# Patient Record
Sex: FEMALE | Race: WHITE | NOT HISPANIC OR LATINO | URBAN - METROPOLITAN AREA
[De-identification: names, ages, dates, MRNs, and addresses within clinical notes are randomized per-mention and may not be internally consistent; named-entity substitution may affect disease eponyms.]

---

## 2023-11-22 ENCOUNTER — APPOINTMENT (OUTPATIENT)
Dept: OBSTETRICS AND GYNECOLOGY | Facility: CLINIC | Age: 33
End: 2023-11-22
Payer: COMMERCIAL

## 2023-11-30 ENCOUNTER — OFFICE VISIT (OUTPATIENT)
Dept: OBSTETRICS AND GYNECOLOGY | Facility: CLINIC | Age: 33
End: 2023-11-30
Payer: COMMERCIAL

## 2023-11-30 VITALS
BODY MASS INDEX: 20.43 KG/M2 | HEIGHT: 62 IN | DIASTOLIC BLOOD PRESSURE: 62 MMHG | SYSTOLIC BLOOD PRESSURE: 104 MMHG | WEIGHT: 111 LBS

## 2023-11-30 DIAGNOSIS — R87.618 PAP SMEAR ABNORMALITY OF CERVIX/HUMAN PAPILLOMAVIRUS (HPV) POSITIVE: ICD-10-CM

## 2023-11-30 DIAGNOSIS — Z01.419 ENCOUNTER FOR ANNUAL ROUTINE GYNECOLOGICAL EXAMINATION: Primary | ICD-10-CM

## 2023-11-30 DIAGNOSIS — R87.612 LOW GRADE SQUAMOUS INTRAEPITH LESION ON CYTOLOGIC SMEAR CERVIX (LGSIL): ICD-10-CM

## 2023-11-30 PROCEDURE — 87624 HPV HI-RISK TYP POOLED RSLT: CPT

## 2023-11-30 PROCEDURE — 99395 PREV VISIT EST AGE 18-39: CPT | Mod: MUE

## 2023-11-30 PROCEDURE — 88175 CYTOPATH C/V AUTO FLUID REDO: CPT | Mod: TC

## 2023-11-30 PROCEDURE — 99395 PREV VISIT EST AGE 18-39: CPT

## 2023-11-30 RX ORDER — SUMATRIPTAN 50 MG/1
50 TABLET, FILM COATED ORAL ONCE AS NEEDED
COMMUNITY

## 2023-11-30 RX ORDER — ACETAMINOPHEN 325 MG/1
TABLET ORAL
COMMUNITY

## 2023-11-30 ASSESSMENT — ENCOUNTER SYMPTOMS
NEUROLOGICAL NEGATIVE: 0
CHILLS: 0
UNEXPECTED WEIGHT CHANGE: 0
DYSURIA: 0
RESPIRATORY NEGATIVE: 0
FEVER: 0
PSYCHIATRIC NEGATIVE: 0
ENDOCRINE NEGATIVE: 0
DIZZINESS: 0
COLOR CHANGE: 0
CARDIOVASCULAR NEGATIVE: 0
COUGH: 0
CONSTITUTIONAL NEGATIVE: 0
OCCASIONAL FEELINGS OF UNSTEADINESS: 0
HEADACHES: 0
EYES NEGATIVE: 0
ABDOMINAL PAIN: 0
MUSCULOSKELETAL NEGATIVE: 0
SHORTNESS OF BREATH: 0
VOMITING: 0
HEMATOLOGIC/LYMPHATIC NEGATIVE: 0
FATIGUE: 0
DEPRESSION: 0
GASTROINTESTINAL NEGATIVE: 0
ALLERGIC/IMMUNOLOGIC NEGATIVE: 0
NAUSEA: 0
LOSS OF SENSATION IN FEET: 0

## 2023-11-30 ASSESSMENT — LIFESTYLE VARIABLES
HOW OFTEN DO YOU HAVE SIX OR MORE DRINKS ON ONE OCCASION: NEVER
HOW OFTEN DO YOU HAVE A DRINK CONTAINING ALCOHOL: NEVER
AUDIT-C TOTAL SCORE: 0
HOW MANY STANDARD DRINKS CONTAINING ALCOHOL DO YOU HAVE ON A TYPICAL DAY: PATIENT DOES NOT DRINK
SKIP TO QUESTIONS 9-10: 1

## 2023-11-30 ASSESSMENT — PATIENT HEALTH QUESTIONNAIRE - PHQ9
SUM OF ALL RESPONSES TO PHQ9 QUESTIONS 1 & 2: 0
1. LITTLE INTEREST OR PLEASURE IN DOING THINGS: NOT AT ALL
2. FEELING DOWN, DEPRESSED OR HOPELESS: NOT AT ALL

## 2023-11-30 ASSESSMENT — PAIN SCALES - GENERAL: PAINLEVEL: 0-NO PAIN

## 2023-11-30 NOTE — PROGRESS NOTES
"Markie 2022  Subjective   Bob Wheeler is a 32 y.o. female who is here for a routine GYN exam. Last saw Dr. Mensah for colposcopy 2022, pathology c/w HPV changes. Pap prior to colpo 2022 with pathology ASCUS/LGSIL/pos HPV. Stopped OCP , has been actively trying to conceive without success. Menses are overall regular, tracking with [employer]ary, active during predicted ovulatory window. Has not used ovulation strips. Has stopped smoking cigarettes, has now been trialing vape to help stop completely and has already noticed improvement and lack of desire to want to smoke. Partner does smoke regularly. Believes partner had semen analysis with his provider that came back normal. Denies known STI history. Taking OTC prenatal vitamin gummy regularly. Denies vaginal irritation symptoms. Denies breast changes or concerns.    Complaints: see HPI  Periods: regular  Dysmenorrhea: none    Current contraception: none  History of abnormal Pap smear: yes  History of abnormal mammogram: no      OB History          1    Para        Term                AB   1    Living             SAB        IAB        Ectopic        Multiple        Live Births                      Review of Systems   Constitutional:  Negative for chills, fatigue, fever and unexpected weight change.   Respiratory:  Negative for cough and shortness of breath.    Gastrointestinal:  Negative for abdominal pain, nausea and vomiting.   Genitourinary:  Negative for dyspareunia, dysuria, pelvic pain and vaginal discharge.   Skin:  Negative for color change and rash.   Neurological:  Negative for dizziness and headaches.       Objective   /62   Ht 1.581 m (5' 2.25\")   Wt 50.3 kg (111 lb)   LMP 2023   BMI 20.14 kg/m²        General:   Alert and oriented, in no acute distress   Neck: Supple. No visible thyromegaly.    Breast/Axilla: Normal to palpation bilaterally without masses, skin changes, or nipple discharge.    Abdomen: Soft, non-tender, " without masses or organomegaly   Vulva: Normal architecture without erythema, masses, or lesions.    Vagina: Normal mucosa without lesions, masses, or atrophy. No abnormal vaginal discharge.    Cervix: Normal without masses, lesions, or signs of cervicitis; pap smear performed   Uterus: Normal, mobile, non-enlarged uterus   Adnexa: Normal without masses or lesions   Pelvic Floor Normal    Psych Normal affect. Normal mood.      Assessment/Plan   -Pap 5/2022 ASCUS/LGSIL/pos HPV, colpo 6/2022 bx c/w HPV changes; due for repeat pap smear today, obtained.  -Discussed family planning; encouraged continued [employer] zachary use to track menses but encouraged trialing ovulation kit use to better analyze fertile window in combination with timed intercourse, continue prenatal vitamin, importance of smoking cessation in regards to egg/sperm quality, and Importance of healthy diet, routine exercise, and sleep hygiene. Patient will trial these recommendations and call if no success, will plan to have her see OB docs for further evaluation and recommendations at that time.    Tabitha Carmona PA-C    82

## 2023-12-19 LAB
CYTOLOGY CMNT CVX/VAG CYTO-IMP: NORMAL
HPV HR 12 DNA GENITAL QL NAA+PROBE: NEGATIVE
HPV HR GENOTYPES PNL CVX NAA+PROBE: NEGATIVE
HPV16 DNA SPEC QL NAA+PROBE: NEGATIVE
HPV18 DNA SPEC QL NAA+PROBE: NEGATIVE
LAB AP HPV GENOTYPE QUESTION: YES
LAB AP HPV HR: NORMAL
LAB AP PREVIOUS ABNORMAL HISTORY: NORMAL
LABORATORY COMMENT REPORT: NORMAL
LMP START DATE: NORMAL
PATH REPORT.TOTAL CANCER: NORMAL

## 2024-01-24 ENCOUNTER — APPOINTMENT (OUTPATIENT)
Dept: UROLOGY | Facility: CLINIC | Age: 34
End: 2024-01-24
Payer: COMMERCIAL

## 2024-02-06 ENCOUNTER — OFFICE VISIT (OUTPATIENT)
Dept: UROLOGY | Facility: CLINIC | Age: 34
End: 2024-02-06
Payer: COMMERCIAL

## 2024-02-06 VITALS
BODY MASS INDEX: 20.54 KG/M2 | DIASTOLIC BLOOD PRESSURE: 104 MMHG | HEART RATE: 84 BPM | SYSTOLIC BLOOD PRESSURE: 128 MMHG | HEIGHT: 61 IN | WEIGHT: 108.8 LBS

## 2024-02-06 DIAGNOSIS — F52.9 SEXUAL DYSFUNCTION IN FEMALE: Primary | ICD-10-CM

## 2024-02-06 PROCEDURE — 99204 OFFICE O/P NEW MOD 45 MIN: CPT | Performed by: NURSE PRACTITIONER

## 2024-02-06 PROCEDURE — 1036F TOBACCO NON-USER: CPT | Performed by: NURSE PRACTITIONER

## 2024-02-06 RX ORDER — BUPROPION HYDROCHLORIDE 75 MG/1
TABLET ORAL
Qty: 120 TABLET | Refills: 11 | Status: SHIPPED | OUTPATIENT
Start: 2024-02-06

## 2024-02-06 NOTE — PROGRESS NOTES
UROLOGIC INITIAL EVALUATION     PROBLEM LIST:  1. Sexual dysfunction in female  Follicle stimulating hormone    Testosterone    Estrogens, Total    Progesterone    buPROPion (Wellbutrin) 75 mg tablet    Referral to Physical Therapy           HISTORY OF PRESENT ILLNESS:   Bob Wheeler is a 33 y.o. with hx depression  Presents today for evaluation and management of sexual dysfunction  Reports discomfort with sex, lack of lubrication, and low desire  , they are currently living with his parents    PAST MEDICAL HISTORY:  Past Medical History:   Diagnosis Date    Anxiety     Depression     History of abnormal cervical Pap smear     History of dysmenorrhea     Migraines        PAST SURGICAL HISTORY:  Past Surgical History:   Procedure Laterality Date    EYE SURGERY          ALLERGIES:   No Known Allergies     MEDICATIONS:   Current Outpatient Medications on File Prior to Visit   Medication Sig Dispense Refill    SUMAtriptan (Imitrex) 50 mg tablet Take 1 tablet (50 mg) by mouth 1 time if needed.      acetaminophen (Tylenol) 325 mg tablet TAKE 1 TABLET EVERY SIX HOURS AS NEEDED FOR PAIN OR FEVER FOR UP TO 7 DAYS       No current facility-administered medications on file prior to visit.        SOCIAL HISTORY:  Patient  reports that she quit smoking about 6 months ago. Her smoking use included cigarettes. She has never used smokeless tobacco. She reports that she does not currently use alcohol. She reports current drug use. Drug: Marijuana.   Social History     Socioeconomic History    Marital status: Significant Other     Spouse name: Not on file    Number of children: Not on file    Years of education: Not on file    Highest education level: Not on file   Occupational History    Not on file   Tobacco Use    Smoking status: Former     Types: Cigarettes     Quit date: 2023     Years since quittin.5    Smokeless tobacco: Never   Vaping Use    Vaping Use: Every day    Substances: Nicotine, Flavoring     "Devices: Disposable   Substance and Sexual Activity    Alcohol use: Not Currently    Drug use: Yes     Types: Marijuana    Sexual activity: Yes     Partners: Male     Birth control/protection: None   Other Topics Concern    Not on file   Social History Narrative    Not on file     Social Determinants of Health     Financial Resource Strain: Not on file   Food Insecurity: Not on file   Transportation Needs: Not on file   Physical Activity: Not on file   Stress: Not on file   Social Connections: Not on file   Intimate Partner Violence: Not on file   Housing Stability: Not on file       FAMILY HISTORY:  Family History   Problem Relation Name Age of Onset    Asthma Mother      No Known Problems Father         REVIEW OF SYSTEMS:  All systems reviewed, pertinent negatives as noted in HPI.     PHYSICAL EXAM:  Visit Vitals  BP (!) 128/104   Pulse 84     Constitutional: Well-developed and well-nourished. No distress.    Head: Normocephalic and atraumatic.    Neck: Normal range of motion.     Pulmonary/Chest: Effort normal. No respiratory distress.   Abdominal: Non-distended.  : Deferred.  Integumentary: No rash or lesions visualized.  Musculoskeletal: Normal range of motion.    Neurological: Alert and oriented.  Psychiatric: Normal mood and affect. Thought content normal.      LABORATORY REVIEW:   No results found for: \"GLU\", \"BUN\", \"CREATININE\", \"EGFR\", \"NA\", \"K\", \"CL\", \"CO2\", \"OSMOLALITY\", \"CALCIUM\"   No results found for: \"WBC\", \"RBC\", \"HGB\", \"HCT\", \"MCV\", \"MCH\", \"MCHC\", \"RDW\", \"PLT\", \"MPV\"          Assessment:      1. Sexual dysfunction in female  Follicle stimulating hormone    Testosterone    Estrogens, Total    Progesterone    buPROPion (Wellbutrin) 75 mg tablet    Referral to Physical Therapy          Bob Wheeler is a 33 y.o. with dyspareunia, lack of lubrication, and low desire. Discussed multifactorial nature of sexual function, including physical, psychological, and emotional factors.     Plan:   Referral to " pelvic floor PT  Referral to psychologist, Dr. Farley  Trial of bupropion 150 mg po daily to start  Check hormone panel  RTC with Spring Quiroz NP, for further management  Encouraged to contact us in the interim with any questions, concerns

## 2024-02-19 ENCOUNTER — APPOINTMENT (OUTPATIENT)
Dept: UROLOGY | Facility: CLINIC | Age: 34
End: 2024-02-19
Payer: COMMERCIAL

## 2024-02-27 ENCOUNTER — APPOINTMENT (OUTPATIENT)
Dept: UROLOGY | Facility: CLINIC | Age: 34
End: 2024-02-27
Payer: COMMERCIAL

## 2024-03-28 ENCOUNTER — APPOINTMENT (OUTPATIENT)
Dept: UROLOGY | Facility: CLINIC | Age: 34
End: 2024-03-28
Payer: COMMERCIAL

## 2025-09-29 ENCOUNTER — APPOINTMENT (OUTPATIENT)
Dept: OBSTETRICS AND GYNECOLOGY | Facility: CLINIC | Age: 35
End: 2025-09-29
Payer: COMMERCIAL